# Patient Record
Sex: FEMALE
[De-identification: names, ages, dates, MRNs, and addresses within clinical notes are randomized per-mention and may not be internally consistent; named-entity substitution may affect disease eponyms.]

---

## 2017-05-29 ENCOUNTER — HOSPITAL ENCOUNTER (EMERGENCY)
Dept: HOSPITAL 31 - C.ER | Age: 42
Discharge: HOME | End: 2017-05-29
Payer: MEDICAID

## 2017-05-29 VITALS
HEART RATE: 77 BPM | DIASTOLIC BLOOD PRESSURE: 71 MMHG | SYSTOLIC BLOOD PRESSURE: 111 MMHG | TEMPERATURE: 98.1 F | OXYGEN SATURATION: 99 %

## 2017-05-29 VITALS — RESPIRATION RATE: 20 BRPM

## 2017-05-29 DIAGNOSIS — N39.0: ICD-10-CM

## 2017-05-29 DIAGNOSIS — Z72.0: ICD-10-CM

## 2017-05-29 DIAGNOSIS — J40: Primary | ICD-10-CM

## 2017-05-29 LAB
BACTERIA #/AREA URNS HPF: (no result) /[HPF]
BILIRUB UR-MCNC: NEGATIVE MG/DL
GLUCOSE UR STRIP-MCNC: NORMAL MG/DL
KETONES UR STRIP-MCNC: NEGATIVE MG/DL
LEUKOCYTE ESTERASE UR-ACNC: (no result) LEU/UL
PH UR STRIP: 5 [PH] (ref 5–8)
PROT UR STRIP-MCNC: NEGATIVE MG/DL
RBC # UR STRIP: (no result) /UL
RBC #/AREA URNS HPF: 16 /HPF (ref 0–3)
SP GR UR STRIP: 1.03 (ref 1–1.03)
UROBILINOGEN UR-MCNC: NORMAL MG/DL (ref 0.2–1)
WBC #/AREA URNS HPF: 15 /HPF (ref 0–5)

## 2017-05-29 NOTE — C.PDOC
Time Seen by Provider: 05/29/17 21:56


Chief Complaint (Nursing): Cough, Cold, Congestion


History Per: Patient, Family


Onset/Duration Of Symptoms: Days (2)


Current Symptoms Are (Timing): Still Present


Associated Symptoms: Cough, Sputum, Nasal Congestion


Severity: Moderate


Additional History Per: Prior Records





Past Medical History


Reviewed: Historical Data, Nursing Documentation, Vital Signs


Vital Signs: 





 Last Vital Signs











Temp  98.4 F   05/29/17 21:38


 


Pulse  72   05/29/17 21:38


 


Resp  20   05/29/17 21:38


 


BP  106/68   05/29/17 21:38


 


Pulse Ox  98   05/29/17 21:38














- Medical History


PMH: No Chronic Diseases


Family History: States: Unknown Family Hx





- Social History


Hx Tobacco Use: Yes


Hx Alcohol Use: No


Hx Substance Use: No





- Immunization History


Hx Tetanus Toxoid Vaccination: No


Hx Influenza Vaccination: No


Hx Pneumococcal Vaccination: No





Review Of Systems


Except As Marked, All Systems Reviewed And Found Negative.


Constitutional: Negative for: Fever, Weakness


ENT: Positive for: Nose Congestion


Cardiovascular: Negative for: Chest Pain


Respiratory: Positive for: Cough, Sputum.  Negative for: Shortness of Breath, 

Hemoptysis


Gastrointestinal: Positive for: Abdominal Pain (suprapubic).  Negative for: 

Vomiting, Diarrhea


Genitourinary: Positive for: Dysuria


Musculoskeletal: Negative for: Neck Pain, Back Pain


Skin: Negative for: Rash


Neurological: Negative for: Weakness, Numbness, Seizures, Altered Mental Status





Physical Exam





- Physical Exam


Appears: Non-toxic, No Acute Distress


Skin: Normal Color, Warm, Dry, No Rash


Head: Atraumatic, Normacephalic


Eye(s): bilateral: Normal Inspection, PERRL, EOMI


Ear(s): Bilateral: Normal


Throat: Normal


Neck: Normal ROM, Supple


Cardiovascular: Rhythm Regular


Respiratory: Normal Breath Sounds, No Accessory Muscle Use


Gastrointestinal/Abdominal: Soft, No Tenderness


Back: No CVA Tenderness


Extremity: Normal ROM, No Pedal Edema, No Calf Tenderness


Neurological/Psych: Oriented x3, Normal Motor, Normal Sensation





ED Course And Treatment





- Laboratory Results


Interpretation Of Abnormal: Possible UTI


Urine Pregnancy POC: Negative


O2 Sat by Pulse Oximetry: 98


Pulse Ox Interpretation: Normal





- Radiology


CXR: Interpreted by Me, Viewed By Me


CXR Interpretation: Yes: No Acute Disease





Disposition


Counseled Patient/Family Regarding: Studies Performed, Diagnosis, Need For 

Followup, Rx Given, Smoking Cessation





- Disposition


Referrals: 


[facility] [Outside]


Disposition: HOME/ ROUTINE


Disposition Time: 22:34


Condition: STABLE


Additional Instructions: 


Drink plenty of fluids. Stop smoking. Follow up in the clinic for further 

evaluation and treatment. Return to the ER if you develop shortness of breath, 

fever, vomiting, worsening of symptoms or if you have any other concerns. 


Prescriptions: 


Sulfamethoxazole/Trimethoprim [Bactrim  mg-160 mg] 1 tab PO BID #10 tab


Instructions:  Cold Symptoms (ED), Urinary Tract Infection in Women (ED)


Print Language: French





- Clinical Impression


Clinical Impression: 


 Bronchitis, UTI (urinary tract infection)

## 2017-05-30 NOTE — RAD
HISTORY:

Productive cough  



COMPARISON:

No prior.



TECHNIQUE:

Chest PA and lateral



FINDINGS:



LUNGS:

Mild venous congestion.  Patchy increased markings at the medial 

right lung base may represent superimposed atelectasis and or subtle 

infiltrate.  Bibasilar breast and nipple shadows.



PLEURA:

No significant pleural effusion identified. No pneumothorax apparent.



CARDIOVASCULAR:

Normal.



OSSEOUS STRUCTURES:

No significant abnormalities.



VISUALIZED UPPER ABDOMEN:

Normal.



OTHER FINDINGS:

None.



IMPRESSION:





Mild venous congestion.  Patchy increased markings at the medial 

right lung base may represent superimposed atelectasis and or subtle 

infiltrate.  Bibasilar breast and nipple shadows.

## 2017-08-29 ENCOUNTER — HOSPITAL ENCOUNTER (EMERGENCY)
Dept: HOSPITAL 31 - C.ER | Age: 42
Discharge: HOME | End: 2017-08-29
Payer: COMMERCIAL

## 2017-08-29 VITALS
TEMPERATURE: 98.7 F | DIASTOLIC BLOOD PRESSURE: 78 MMHG | SYSTOLIC BLOOD PRESSURE: 116 MMHG | HEART RATE: 74 BPM | OXYGEN SATURATION: 99 % | RESPIRATION RATE: 18 BRPM

## 2017-08-29 DIAGNOSIS — N94.6: Primary | ICD-10-CM

## 2017-08-29 LAB
BACTERIA #/AREA URNS HPF: (no result) /[HPF]
BILIRUB UR-MCNC: NEGATIVE MG/DL
GLUCOSE UR STRIP-MCNC: NORMAL MG/DL
KETONES UR STRIP-MCNC: NEGATIVE MG/DL
LEUKOCYTE ESTERASE UR-ACNC: (no result) LEU/UL
PH UR STRIP: 6 [PH] (ref 5–8)
PROT UR STRIP-MCNC: NEGATIVE MG/DL
RBC # UR STRIP: (no result) /UL
RBC #/AREA URNS HPF: 8 /HPF (ref 0–3)
SP GR UR STRIP: 1.02 (ref 1–1.03)
UROBILINOGEN UR-MCNC: 2 MG/DL (ref 0.2–1)
WBC #/AREA URNS HPF: 1 /HPF (ref 0–5)

## 2017-08-29 NOTE — C.PDOC
History Of Present Illness





42 y/o female c/o suprapubic pain since her onset of menses 4 days prior. Notes 

usually getting the same pain with her period, yet this lasted longer which 

concerned her. Patient reports increased suprapubic pressure during urination. 

Denies dysuria, vaginal discharge, fever, chills, nausea, vomiting, or any 

other complaints. Patient is still bleeding due to menses. Took no medication 

for pain.





Time Seen by Provider: 08/29/17 22:35


Chief Complaint (Nursing): Female Genitourinary


History Per: Patient


History/Exam Limitations: no limitations


Onset/Duration Of Symptoms: Days (4)


Current Symptoms Are (Timing): Still Present


Severity: Mild


Quality Of Discomfort: Gas


Associated Symptoms: denies: Fever, Chills


Alleviating Factors: None


Recent travel outside of the [place]: No


Additional History Per: Patient


Abnormal Vaginal Bleeding: No





Past Medical History


Reviewed: Historical Data, Nursing Documentation, Vital Signs


Vital Signs: 


 Last Vital Signs











Temp  98.7 F   08/29/17 22:29


 


Pulse  74   08/29/17 22:29


 


Resp  18   08/29/17 22:29


 


BP  116/78   08/29/17 22:29


 


Pulse Ox  99   08/30/17 01:36











Family History: States: Unknown Family Hx





- Social History


Hx Tobacco Use: Yes


Hx Alcohol Use: No


Hx Substance Use: No





- Immunization History


Hx Tetanus Toxoid Vaccination: No


Hx Influenza Vaccination: No


Hx Pneumococcal Vaccination: No





Review Of Systems


Constitutional: Negative for: Fever, Chills


Gastrointestinal: Positive for: Abdominal Pain (Suprapubic).  Negative for: 

Nausea, Vomiting


Genitourinary: Positive for: Vaginal Bleeding (Due to menses).  Negative for: 

Dysuria, Vaginal Discharge





Physical Exam





- Physical Exam


Appears: Non-toxic, No Acute Distress


Skin: Warm, Dry


Head: Atraumatic, Normacephalic


Cardiovascular: Rhythm Regular


Respiratory: Normal Breath Sounds, No Rales, No Rhonchi, No Wheezing


Gastrointestinal/Abdominal: Soft, Tenderness (Mid-suprapubic area), No Guarding

, No Rebound, Other (Obese)


Pelvic: Other (Not performed due to menstral flow)


Neurological/Psych: Oriented x3, Normal Speech, Normal Cognition





ED Course And Treatment


O2 Sat by Pulse Oximetry: 99 (RA)


Pulse Ox Interpretation: Normal


Progress Note: Impression: 42 y/o female c/o suparapubic pain since onset of 

menses 4 days ago.  Plans: UA, Blood work up, Motrin.  UA: negative for 

infection and pregnancy.  Patient is in no acute distress at this time. Patient 

advised she will feel better with Motrin and advised to follow up with PMD for 

further evaluation and to return if symtoms worsens.





Disposition


Counseled Patient/Family Regarding: Diagnosis, Need For Followup, Rx Given





- Disposition


Referrals: 


[facility] [Outside]


[facility] [Outside]


Disposition: HOME/ ROUTINE


Disposition Time: 23:15


Condition: STABLE


Additional Instructions: 


Faith motrin si dolor





Sigue en la clinica de mujeres





Regresa si peor 





Prescriptions: 


Ibuprofen [Motrin] 600 mg PO Q6H #30 tab


Instructions:  Pelvic Pain in Women (ED)


Forms:  [facility] (English)


Print Language: English





- Clinical Impression


Clinical Impression: 


 Pelvic pain, Dysmenorrhea








- Scribe Statement


The provider has reviewed the documentation as recorded by the Timibkirstin whitley





All medical record entries made by the Timibkirstin were at my direction and 

personally dictated by me. I have reviewed the chart and agree that the record 

accurately reflects my personal performance of the history, physical exam, 

medical decision making, and the department course for this patient. I have 

also personally directed, reviewed, and agree with the discharge instructions 

and disposition.

## 2018-05-25 ENCOUNTER — HOSPITAL ENCOUNTER (EMERGENCY)
Dept: HOSPITAL 31 - C.ER | Age: 43
Discharge: HOME | End: 2018-05-25
Payer: COMMERCIAL

## 2018-05-25 VITALS
SYSTOLIC BLOOD PRESSURE: 112 MMHG | HEART RATE: 80 BPM | TEMPERATURE: 98.2 F | RESPIRATION RATE: 18 BRPM | DIASTOLIC BLOOD PRESSURE: 72 MMHG

## 2018-05-25 VITALS — OXYGEN SATURATION: 98 %

## 2018-05-25 DIAGNOSIS — R10.30: Primary | ICD-10-CM

## 2018-05-25 LAB
ALBUMIN SERPL-MCNC: 3.9 G/DL (ref 3.5–5)
ALBUMIN/GLOB SERPL: 1.2 {RATIO} (ref 1–2.1)
ALT SERPL-CCNC: 27 U/L (ref 9–52)
AST SERPL-CCNC: 17 U/L (ref 14–36)
BASOPHILS # BLD AUTO: 0 K/UL (ref 0–0.2)
BASOPHILS NFR BLD: 0.5 % (ref 0–2)
BILIRUB UR-MCNC: NEGATIVE MG/DL
BUN SERPL-MCNC: 15 MG/DL (ref 7–17)
CALCIUM SERPL-MCNC: 9.1 MG/DL (ref 8.6–10.4)
EOSINOPHIL # BLD AUTO: 0.1 K/UL (ref 0–0.7)
EOSINOPHIL NFR BLD: 1.2 % (ref 0–4)
ERYTHROCYTE [DISTWIDTH] IN BLOOD BY AUTOMATED COUNT: 12.8 % (ref 11.5–14.5)
GFR NON-AFRICAN AMERICAN: > 60
GLUCOSE UR STRIP-MCNC: NORMAL MG/DL
HCG,QUALITATIVE URINE: NEGATIVE
HGB BLD-MCNC: 11.9 G/DL (ref 11–16)
LEUKOCYTE ESTERASE UR-ACNC: (no result) LEU/UL
LIPASE: 39 U/L (ref 23–300)
LYMPHOCYTES # BLD AUTO: 2.2 K/UL (ref 1–4.3)
LYMPHOCYTES NFR BLD AUTO: 28.6 % (ref 20–40)
MCH RBC QN AUTO: 31.4 PG (ref 27–31)
MCHC RBC AUTO-ENTMCNC: 34.7 G/DL (ref 33–37)
MCV RBC AUTO: 90.4 FL (ref 81–99)
MONOCYTES # BLD: 0.5 K/UL (ref 0–0.8)
MONOCYTES NFR BLD: 6.3 % (ref 0–10)
NEUTROPHILS # BLD: 4.9 K/UL (ref 1.8–7)
NEUTROPHILS NFR BLD AUTO: 63.4 % (ref 50–75)
NRBC BLD AUTO-RTO: 0 % (ref 0–2)
PH UR STRIP: 6 [PH] (ref 5–8)
PLATELET # BLD: 258 K/UL (ref 130–400)
PMV BLD AUTO: 8.2 FL (ref 7.2–11.7)
PROT UR STRIP-MCNC: NEGATIVE MG/DL
RBC # BLD AUTO: 3.78 MIL/UL (ref 3.8–5.2)
RBC # UR STRIP: (no result) /UL
SP GR UR STRIP: 1.02 (ref 1–1.03)
SQUAMOUS EPITHIAL: 1 /HPF (ref 0–5)
UROBILINOGEN UR-MCNC: NORMAL MG/DL (ref 0.2–1)
WBC # BLD AUTO: 7.8 K/UL (ref 4.8–10.8)

## 2018-05-25 PROCEDURE — 99284 EMERGENCY DEPT VISIT MOD MDM: CPT

## 2018-05-25 PROCEDURE — 80053 COMPREHEN METABOLIC PANEL: CPT

## 2018-05-25 PROCEDURE — 74022 RADEX COMPL AQT ABD SERIES: CPT

## 2018-05-25 PROCEDURE — 81001 URINALYSIS AUTO W/SCOPE: CPT

## 2018-05-25 PROCEDURE — 96374 THER/PROPH/DIAG INJ IV PUSH: CPT

## 2018-05-25 PROCEDURE — 83690 ASSAY OF LIPASE: CPT

## 2018-05-25 PROCEDURE — 84703 CHORIONIC GONADOTROPIN ASSAY: CPT

## 2018-05-25 PROCEDURE — 85025 COMPLETE CBC W/AUTO DIFF WBC: CPT

## 2018-05-25 NOTE — C.PDOC
History Of Present Illness


41 y/o female presents to ED with complaints of lower abdominal pain for 3 days 

associated with urinary frequency. Patient states last bowel movement was 

earlier  today and was normal. Patient reports she was recently tested for UTI 

and was given Cipro which she took 1 dose of but didnt think it was helping. 

Patient denies dysuria, vaginal bleeding, vaginal discharge, n/v/d, fever, back 

pain or any other complaints at this time. LMP 2 days ago as per patient. 


Time Seen by Provider: 05/25/18 12:36


Chief Complaint (Nursing): Abdominal Pain


History Per: Patient,  (Corina Thompson)


History/Exam Limitations: no limitations


Onset/Duration Of Symptoms: Days


Current Symptoms Are (Timing): Still Present


Location Of Pain/Discomfort: Other (pelvic pain)





Past Medical History


Reviewed: Historical Data, Nursing Documentation, Vital Signs


Vital Signs: 


 Last Vital Signs











Temp  98.2 F   05/25/18 16:19


 


Pulse  80   05/25/18 16:19


 


Resp  18   05/25/18 16:19


 


BP  112/72   05/25/18 16:19


 


Pulse Ox  98   05/25/18 16:19














- Medical History


PMH: No Chronic Diseases


Surgical History: No Surg Hx


Family History: States: No Known Family Hx





- Social History


Hx Tobacco Use: Yes


Hx Alcohol Use: No


Hx Substance Use: No





- Immunization History


Hx Tetanus Toxoid Vaccination: No


Hx Influenza Vaccination: No


Hx Pneumococcal Vaccination: No





Review Of Systems


Constitutional: Negative for: Fever, Chills


Gastrointestinal: Negative for: Nausea, Vomiting


Genitourinary: Positive for: Frequency, Pelvic Pain.  Negative for: Dysuria, 

Vaginal Discharge, Vaginal Bleeding


Musculoskeletal: Negative for: Back Pain


Skin: Negative for: Rash


Neurological: Negative for: Weakness, Numbness





Physical Exam





- Physical Exam


Appears: Non-toxic, No Acute Distress


Skin: Warm, Dry, No Rash


Head: Atraumatic, Normacephalic


Eye(s): bilateral: Normal Inspection, EOMI


Nose: Normal


Oral Mucosa: Moist


Neck: Normal ROM, Supple


Cardiovascular: Rhythm Regular


Respiratory: Normal Breath Sounds, No Rales, No Rhonchi, No Wheezing


Gastrointestinal/Abdominal: Soft, Tenderness (suprapubic tenderness), No 

Guarding, No Rebound


Back: No CVA Tenderness, No Vertebral Tenderness


Extremity: Normal ROM


Neurological/Psych: Oriented x3, Normal Speech, Normal Cognition





ED Course And Treatment





- Laboratory Results


Result Diagrams: 


 05/25/18 13:21





 05/25/18 13:21


O2 Sat by Pulse Oximetry: 98 (RA)


Pulse Ox Interpretation: Normal


Progress Note: Blood work, IV fluids, obstructive series and UA ordered. 

Toradol administered.  On re eval patient states pain has improved. Tolerating 

PO. Afebrile. Abdomen remains soft, nontender. Discussed with pt signs and 

symptoms of concern and limitations of work up. Pt requests to be discharged 

noting she is asymptomatic. Instructed to return to ER if symptoms persist or 

worsen.





Disposition





- Disposition


Referrals: 


[facility] [Outside]


Disposition: HOME/ ROUTINE


Disposition Time: 15:42


Condition: STABLE


Additional Instructions: 


Increase you fluids and fiber in your diet. Follow up with PMD in 1-2 days. 

Return to ER if symptoms persist or worsen.








Aumente yisel lquidos y fibra en romano dieta. Danial un seguimiento con PMD en 1-2 d

as. Regrese a la nisa de emergencias si los sntomas persisten o empeoran.


Prescriptions: 


Polyethylene Glycol 3350 [Miralax] 17 gm PO DAILY #85 gm


Instructions:  Acute Abdomen (Belly Pain), Adult (DC)


Forms:  [employer] (English)


Print Language: Ecuadorean





- Clinical Impression


Clinical Impression: 


 Abdominal pain








- PA / NP / Resident Statement


MD/DO has reviewed & agrees with the documentation as recorded.





- Scribe Statement


The provider has reviewed the documentation as recorded by the Scribe


Donna Ryder





All medical record entries made by the Timibe were at my direction and 

personally dictated by me. I have reviewed the chart and agree that the record 

accurately reflects my personal performance of the history, physical exam, 

medical decision making, and the department course for this patient. I have 

also personally directed, reviewed, and agree with the discharge instructions 

and disposition.

## 2018-10-21 ENCOUNTER — HOSPITAL ENCOUNTER (EMERGENCY)
Dept: HOSPITAL 31 - C.ER | Age: 43
Discharge: HOME | End: 2018-10-21
Payer: SELF-PAY

## 2018-10-21 VITALS — OXYGEN SATURATION: 100 %

## 2018-10-21 VITALS
DIASTOLIC BLOOD PRESSURE: 65 MMHG | HEART RATE: 63 BPM | SYSTOLIC BLOOD PRESSURE: 112 MMHG | RESPIRATION RATE: 20 BRPM | TEMPERATURE: 98.9 F

## 2018-10-21 DIAGNOSIS — R10.9: ICD-10-CM

## 2018-10-21 DIAGNOSIS — D25.9: Primary | ICD-10-CM

## 2018-10-21 LAB
ALBUMIN SERPL-MCNC: 4.1 G/DL (ref 3.5–5)
ALBUMIN/GLOB SERPL: 1.4 {RATIO} (ref 1–2.1)
ALT SERPL-CCNC: 24 U/L (ref 9–52)
AST SERPL-CCNC: 13 U/L (ref 14–36)
BASOPHILS # BLD AUTO: 0.1 K/UL (ref 0–0.2)
BASOPHILS NFR BLD: 0.7 % (ref 0–2)
BILIRUB UR-MCNC: NEGATIVE MG/DL
BUN SERPL-MCNC: 13 MG/DL (ref 7–17)
CALCIUM SERPL-MCNC: 9.1 MG/DL (ref 8.6–10.4)
EOSINOPHIL # BLD AUTO: 0.1 K/UL (ref 0–0.7)
EOSINOPHIL NFR BLD: 1.6 % (ref 0–4)
ERYTHROCYTE [DISTWIDTH] IN BLOOD BY AUTOMATED COUNT: 12.7 % (ref 11.5–14.5)
GFR NON-AFRICAN AMERICAN: > 60
GLUCOSE UR STRIP-MCNC: NORMAL MG/DL
HGB BLD-MCNC: 12.1 G/DL (ref 11–16)
LEUKOCYTE ESTERASE UR-ACNC: (no result) LEU/UL
LIPASE: 49 U/L (ref 23–300)
LYMPHOCYTES # BLD AUTO: 2.5 K/UL (ref 1–4.3)
LYMPHOCYTES NFR BLD AUTO: 32.5 % (ref 20–40)
MCH RBC QN AUTO: 30.8 PG (ref 27–31)
MCHC RBC AUTO-ENTMCNC: 34.6 G/DL (ref 33–37)
MCV RBC AUTO: 89.1 FL (ref 81–99)
MONOCYTES # BLD: 0.4 K/UL (ref 0–0.8)
MONOCYTES NFR BLD: 5.8 % (ref 0–10)
NEUTROPHILS # BLD: 4.6 K/UL (ref 1.8–7)
NEUTROPHILS NFR BLD AUTO: 59.4 % (ref 50–75)
NRBC BLD AUTO-RTO: 0 % (ref 0–2)
PH UR STRIP: 5 [PH] (ref 5–8)
PLATELET # BLD: 282 K/UL (ref 130–400)
PMV BLD AUTO: 8.4 FL (ref 7.2–11.7)
PROT UR STRIP-MCNC: NEGATIVE MG/DL
RBC # BLD AUTO: 3.92 MIL/UL (ref 3.8–5.2)
RBC # UR STRIP: (no result) /UL
SP GR UR STRIP: 1.03 (ref 1–1.03)
SQUAMOUS EPITHIAL: 1 /HPF (ref 0–5)
UROBILINOGEN UR-MCNC: NORMAL MG/DL (ref 0.2–1)
WBC # BLD AUTO: 7.7 K/UL (ref 4.8–10.8)

## 2018-10-21 PROCEDURE — 85025 COMPLETE CBC W/AUTO DIFF WBC: CPT

## 2018-10-21 PROCEDURE — 81001 URINALYSIS AUTO W/SCOPE: CPT

## 2018-10-21 PROCEDURE — 96374 THER/PROPH/DIAG INJ IV PUSH: CPT

## 2018-10-21 PROCEDURE — 74177 CT ABD & PELVIS W/CONTRAST: CPT

## 2018-10-21 PROCEDURE — 80053 COMPREHEN METABOLIC PANEL: CPT

## 2018-10-21 PROCEDURE — 87086 URINE CULTURE/COLONY COUNT: CPT

## 2018-10-21 PROCEDURE — 96361 HYDRATE IV INFUSION ADD-ON: CPT

## 2018-10-21 PROCEDURE — 83690 ASSAY OF LIPASE: CPT

## 2018-10-21 PROCEDURE — 99285 EMERGENCY DEPT VISIT HI MDM: CPT

## 2018-10-21 NOTE — C.PDOC
Addendum entered and electronically signed by Estuardo Cruz MD  10/21/18 19:46: 








Disposition


Counseled Patient/Family Regarding: Studies Performed, Diagnosis, Need For 

Followup, Rx Given


Clinical Impression: 


 Abdominal pain, Uterine fibroid





Disposition: HOME/ ROUTINE


Disposition Time: 19:00


Condition: FAIR


Instructions:  Acute Abdomen (Belly Pain), Adult (DC), Uterine Fibroids (DC)


Referrals: 


 Service [Outside]


[facility] [Outside]


Stand Alone Forms:  [employer] (English)


Print Language: Scottish





Addendum entered and electronically signed by Estuardo Cruz MD  10/21/18 19:42: 








Disposition


Counseled Patient/Family Regarding: Studies Performed, Diagnosis, Need For 

Followup


Clinical Impression: 


 Abdominal pain, Uterine fibroid





Disposition: HOME/ ROUTINE


Disposition Time: 19:00


Condition: FAIR


Instructions:  Uterine Fibroids (DC), Acute Abdomen (Belly Pain), Adult (DC)


Referrals: 


[facility] [Outside]


 Service [Outside]


Stand Alone Forms:  [employer] (English)





Addendum


Addendum: 





10/21/18 19:41


Upon provider reevaluation patient is feeling better, is medically stable, and 

requires no further treatment in the ED at this time. Patient will be discharged

 home   . Counseling was provided and all questions were answered regarding 

diagnosis and need for follow up with the referred clinic. There is agreement to

 discharge plan. Return if symptoms persist or worsen.





Original Note:








History Of Present Illness





43 years old female presents to ED for complaints of LLQ abdominal pain that 

began 1 day ago. Patient describes pain as sharp in nature. Denies vaginal 

bleeding or discharge, hematuria, dysuria, fever, nausea, vomiting, or diarrhea.

 Patient reports she has been eating well. 


Chief Complaint (Nursing): Abdominal Pain


History Per: Patient


History/Exam Limitations: no limitations


Onset/Duration Of Symptoms: Days (1)


Current Symptoms Are (Timing): Still Present


Location Of Pain/Discomfort: LLQ


Quality Of Discomfort: Sharp


Associated Symptoms: denies: Fever, Chills, Urinary Symptoms


Exacerbating Factors: None


Alleviating Factors: None


Last Bowel Movement: Today


Recent travel outside of the [place]: No





Past Medical History


Reviewed: Historical Data, Nursing Documentation, Vital Signs


Vital Signs: 





                                Last Vital Signs











Temp  98 F   10/21/18 16:25


 


Pulse  68   10/21/18 16:25


 


Resp  16   10/21/18 16:25


 


BP  119/70   10/21/18 16:25


 


Pulse Ox  100   10/21/18 16:25














- Medical History


PMH: No Chronic Diseases


Family History: States: No Known Family Hx





- Social History


Hx Tobacco Use: Yes


Hx Alcohol Use: Yes


Hx Substance Use: No





- Immunization History


Hx Tetanus Toxoid Vaccination: No


Hx Influenza Vaccination: No


Hx Pneumococcal Vaccination: No





Review Of Systems


Constitutional: Negative for: Fever, Chills


Gastrointestinal: Positive for: Abdominal Pain (LLQ).  Negative for: Nausea, 

Vomiting, Diarrhea


Genitourinary: Negative for: Dysuria, Hematuria, Vaginal Discharge, Vaginal 

Bleeding


Skin: Negative for: Rash


Neurological: Negative for: Weakness, Numbness





Physical Exam





- Physical Exam


Appears: Non-toxic, No Acute Distress


Skin: Normal Color, Warm, Dry, No Rash


Head: Atraumatic, Normacephalic


Eye(s): bilateral: Normal Inspection, PERRL, EOMI


Oral Mucosa: Moist


Neck: Normal ROM, Supple


Chest: Symmetrical, No Tenderness


Cardiovascular: Rhythm Regular, No Murmur


Respiratory: Normal Breath Sounds, No Decreased Breath Sounds, No Rales, No Rho

nchi, No Wheezing


Gastrointestinal/Abdominal: Bowel Sounds (Active ), Soft, Tenderness (Minimal 

LLQ point tenderness ), No Guarding, No Rebound


Extremity: Normal ROM


Extremity: Bilateral: Atraumatic, Normal Color And Temperature, Normal ROM


Pulses: Left Radial: Normal, Right Radial: Normal


Neurological/Psych: Oriented x3, Normal Speech


Gait: Steady





ED Course And Treatment





- Laboratory Results


Result Diagrams: 


                                 10/21/18 16:54





                                 10/21/18 16:54


O2 Sat by Pulse Oximetry: 100 (RA)


Pulse Ox Interpretation: Normal





Medical Decision Making


Medical Decision Making: 





Plan:


* IV fluids


* Toradol


* Blood work


* Urine Culture


* Urinalysis


* CT abdomen/Pelvis








Disposition





- Disposition


Disposition Time: 19:00


Condition: STABLE


Forms:  [facility] (English)





- Clinical Impression


Clinical Impression: 


 Abdominal pain








- Scribe Statement


The provider has reviewed the documentation as recorded by the Scribe





Brenna Champagne





All medical record entries made by the Scribe were at my direction and 

personally dictated by me. I have reviewed the chart and agree that the record 

accurately reflects my personal performance of the history, physical exam, 

medical decision making, and the department course for this patient. I have also

 personally directed, reviewed, and agree with the discharge instructions and 

disposition.

## 2018-10-22 ENCOUNTER — HOSPITAL ENCOUNTER (EMERGENCY)
Dept: HOSPITAL 31 - C.ER | Age: 43
LOS: 1 days | Discharge: HOME | End: 2018-10-23
Payer: SELF-PAY

## 2018-10-22 DIAGNOSIS — K59.00: Primary | ICD-10-CM

## 2018-10-22 DIAGNOSIS — D25.9: ICD-10-CM

## 2018-10-22 PROCEDURE — 99284 EMERGENCY DEPT VISIT MOD MDM: CPT

## 2018-10-22 PROCEDURE — 96372 THER/PROPH/DIAG INJ SC/IM: CPT

## 2018-10-22 PROCEDURE — 74018 RADEX ABDOMEN 1 VIEW: CPT

## 2018-10-22 NOTE — CT
PROCEDURE:  CT Abdomen and Pelvis with contrast



HISTORY:

LLQ tenderness



COMPARISON:

None.



TECHNIQUE:

CT scan of the abdomen pelvis was performed after the administration 

of IV and oral contrast.  Coronal and sagittal reconstructions were 

also acquired. . 



Contrast Dose: 100 cc Visipaque 320 IV contrast



Radiation dose:



Total exam DLP = 1060.36 mGy-cm.



FINDINGS:



LOWER THORAX:

Visualized lung bases demonstrate minimal posterior dependent 

bibasilar atelectasis. 



LIVER:

Unremarkable.  



GALLBLADDER AND BILE DUCTS:

Gallstones noted in the gallbladder. 



PANCREAS:

Unremarkable.



SPLEEN:

Unremarkable. 



ADRENALS:

Unremarkable. 



KIDNEYS AND URETERS:

Unremarkable. No hydronephrosis. 



VASCULATURE:

The aorta is normal in caliber. 



STOMACH AND BOWEL:

There is no abnormal small or large bowel dilatation. 



APPENDIX:

Normal appendix. 



PERITONEUM:

Unremarkable. No free fluid. No free air. 



LYMPH NODES:

There is no significant abdominal or pelvic lymphadenopathy. 



BLADDER:

Unremarkable. 



REPRODUCTIVE:

Uterus is heterogeneous in appearance with possible fibroids.  There 

is an exophytic mass arising from the uterine fundus measuring 

approximately 6 x 5.1 cm, possibly a fibroid.  Other etiologies not 

excluded.  Tampon noted in the vagina. 



BONES:

There are multiple sclerotic lesions: In the left iliac bone 

measuring 1.8 cm, in the left sacrum measuring 0.8 cm, in the left 

posterior acetabulum measuring 1.1 cm, in the right femoral head 

measuring 0.8 cm, and in the left femoral head measuring 0.5 cm.  

Findings favor bone islands.



OTHER FINDINGS:

None.



IMPRESSION:

Heterogeneous appearance of the uterus, possibly due to fibroids.  

Exophytic mass of  the uterine fundus measuring 6 cm, also possibly 

fibroid.  Recommend ultrasound pelvis for further evaluation.



Cholelithiasis. 



Additional findings as above. 



Preliminary impression was provided by the Teleradiology service - 

[facility].  Findings are concordant.

## 2018-10-23 VITALS
OXYGEN SATURATION: 98 % | SYSTOLIC BLOOD PRESSURE: 102 MMHG | TEMPERATURE: 98 F | RESPIRATION RATE: 20 BRPM | HEART RATE: 62 BPM | DIASTOLIC BLOOD PRESSURE: 70 MMHG

## 2018-10-23 NOTE — C.PDOC
History Of Present Illness


43 year old female presents to the ER with a complaint of persistent lower 

abdominal pain. Patient was sen here yesterday in the ED and CT revealed 

fibroids. Denies nausea, vomiting, vaginal bleeding, or vaginal discharge. 

Patient has been taking motrin with some relief.


Time Seen by Provider: 10/22/18 22:36


Chief Complaint (Nursing): Abdominal Pain


History Per: Patient


History/Exam Limitations: no limitations


Onset/Duration Of Symptoms: Days


Current Symptoms Are (Timing): Still Present


Location Of Pain/Discomfort: Suprapubic


Radiation Of Pain To:: None


Quality Of Discomfort: Unable To Describe


Associated Symptoms: denies: Nausea, Vomiting, Other (Vaginal bleeding, Vaginal 

discharge)


Exacerbating Factors: None


Alleviating Factors: None


Recent travel outside of the [place]: No


Abnormal Vaginal Bleeding: No





Past Medical History


Reviewed: Historical Data, Nursing Documentation, Vital Signs


Vital Signs: 





                                Last Vital Signs











Temp  98 F   10/23/18 00:07


 


Pulse  62   10/23/18 00:07


 


Resp  20   10/23/18 00:07


 


BP  102/70   10/23/18 00:07


 


Pulse Ox  98   10/23/18 00:07











Family History: States: No Known Family Hx





- Social History


Hx Tobacco Use: Yes


Hx Alcohol Use: Yes


Hx Substance Use: No





- Immunization History


Hx Tetanus Toxoid Vaccination: No


Hx Influenza Vaccination: No


Hx Pneumococcal Vaccination: No





Review Of Systems


Constitutional: Negative for: Fever, Chills


Cardiovascular: Negative for: Chest Pain, Palpitations


Respiratory: Negative for: Cough, Shortness of Breath


Gastrointestinal: Positive for: Abdominal Pain.  Negative for: Nausea, Vomiting


Genitourinary: Negative for: Vaginal Discharge, Vaginal Bleeding


Neurological: Negative for: Weakness, Numbness





Physical Exam





- Physical Exam


Appears: Non-toxic


Skin: Normal Color, Warm, Dry


Head: Atraumatic, Normacephalic


Eye(s): bilateral: Normal Inspection


Oral Mucosa: Moist


Neck: Normal, Supple


Chest: Symmetrical, No Tenderness


Cardiovascular: Rhythm Regular


Respiratory: Normal Breath Sounds, No Rales, No Rhonchi, No Wheezing


Gastrointestinal/Abdominal: Soft, No Tenderness


Back: No CVA Tenderness


Neurological/Psych: Oriented x3, Normal Speech





ED Course And Treatment


O2 Sat by Pulse Oximetry: 98 (Room air)


Pulse Ox Interpretation: Normal


Progress Note: Abdominal x-ray ordered. Percocet and toradol administered.





Disposition





- Disposition


Referrals: 


 Service [Outside]


[facility] [Outside]


Disposition: HOME/ ROUTINE


Disposition Time: 00:15


Condition: IMPROVED


Additional Instructions: 





JOE PIÑA, thank you for letting us take care of you today. The emergency 

medical care you received today was directed at your acute symptoms. If you were

 prescribed any medication, please fill it and take as directed. It may take 

several days for your symptoms to resolve. Return to the Emergency Department if

 your symptoms worsen, do not improve, or if you have any other problems.





Please contact your doctor or call one of the physicians/clinics you have been 

referred to that are listed on the Patient Visit Information form that is 

included in your discharge packet. Bring any paperwork you were given at 

discharge with you along with any medications you are taking to your follow up 

visit. Our treatment cannot replace ongoing medical care by a primary care 

provider outside of the emergency department.





Thank you for allowing the [facility] beds at the [facility] team to be part of your care today.

















Follow with the women's clinic this week for re-evaluation and further 

management.








JOE PIÑA, modesto por dejarnos cuidar de anne de la fuente. La atencin mdica de

 emergencia que recibi hoy se dirigi a yisel sntomas agudos. Si le recetaron 

algn medicamento, llnelo y tmelo segn las indicaciones. Los sntomas pueden 

tardar varios lopez en resolverse. Regrese al Departamento de Emergencias si yisel 

sntomas empeoran, no mejoran o si tiene otros problemas.





Comunquese con romano mdico o llame a karl de los mdicos / clnicas a los que ha 

sido referido que figuran en el formulario de Informacin de visita al paciente 

que se incluye en romano paquete de kay[place]. Lleve con usted a romano consulta de 

seguimiento toda la documentacin que recibi del kayley junto con los 

medicamentos que est tomando. Nuestro tratamiento no puede reemplazar la 

atencin mdica continua por parte de un proveedor de atencin primaria fuera 

del departamento de emergencias.





Modesto por permitir que el equipo de [facility] beds at the [facility] sea parte de romano atencin 

hoy.

















Siga con la clnica de mujeres esta semana para reevaluar y administrar ms.


Prescriptions: 


Docusate [Colace] 100 mg PO Q8 PRN #15 cap


 PRN Reason: Constipation


traMADol [Ultram] 50 mg PO Q8 PRN #15 tab


 PRN Reason: Pain, Severe (8-10)


Instructions:  Constipation, Adult (DC), Uterine Fibroids (DC)


Forms:  Gen Discharge Inst Citizen of [place], [facility] (Citizen of [place])


Print Language: Maori





- Clinical Impression


Clinical Impression: 


 Constipation, Uterine fibroid








- Scribe Statement


The provider has reviewed the documentation as recorded by the Scribe





Oziel Leung





All medical record entries made by the Scribe were at my direction and 

personally dictated by me. I have reviewed the chart and agree that the record 

accurately reflects my personal performance of the history, physical exam, 

medical decision making, and the department course for this patient. I have also

 personally directed, reviewed, and agree with the discharge instructions and 

disposition.

## 2018-10-23 NOTE — RAD
Date of service: 



10/22/2018



HISTORY:

 lower abdominal pain 



COMPARISON:

10/21/2018 CT abdomen and pelvis



FINDINGS:



BOWEL:

Moderate stool retention especially right colon.  No bowel 

obstruction. 







BONES:

Multiple oval sclerotic lesions-without any additional oncological 

history-bone islands-favored.



Bilateral SI joint sclerotic arthrosis.



OTHER FINDINGS:

None.



IMPRESSION:

Stool retention.  No bowel obstruction suggested.



Multiple pelvic sclerotic foci-without any additional oncological 

history-bone islands-favored.



Bilateral sacroiliac sclerotic arthrosis

## 2019-02-18 ENCOUNTER — HOSPITAL ENCOUNTER (EMERGENCY)
Dept: HOSPITAL 31 - C.ER | Age: 44
LOS: 1 days | Discharge: HOME | End: 2019-02-19
Payer: COMMERCIAL

## 2019-02-18 VITALS — RESPIRATION RATE: 18 BRPM

## 2019-02-18 VITALS — OXYGEN SATURATION: 100 %

## 2019-02-18 DIAGNOSIS — Z3A.01: ICD-10-CM

## 2019-02-18 DIAGNOSIS — O20.0: Primary | ICD-10-CM

## 2019-02-18 LAB
BACTERIA #/AREA URNS HPF: (no result) /[HPF]
BASOPHILS # BLD AUTO: 0 K/UL (ref 0–0.2)
BASOPHILS NFR BLD: 0.4 % (ref 0–2)
BILIRUB UR-MCNC: NEGATIVE MG/DL
BUN SERPL-MCNC: 10 MG/DL (ref 7–17)
CALCIUM SERPL-MCNC: 8.7 MG/DL (ref 8.6–10.4)
EOSINOPHIL # BLD AUTO: 0.1 K/UL (ref 0–0.7)
EOSINOPHIL NFR BLD: 0.9 % (ref 0–4)
ERYTHROCYTE [DISTWIDTH] IN BLOOD BY AUTOMATED COUNT: 13 % (ref 11.5–14.5)
GFR NON-AFRICAN AMERICAN: > 60
GLUCOSE UR STRIP-MCNC: NORMAL MG/DL
HGB BLD-MCNC: 12.3 G/DL (ref 11–16)
LEUKOCYTE ESTERASE UR-ACNC: (no result) LEU/UL
LYMPHOCYTES # BLD AUTO: 2.3 K/UL (ref 1–4.3)
LYMPHOCYTES NFR BLD AUTO: 27.2 % (ref 20–40)
MCH RBC QN AUTO: 30.4 PG (ref 27–31)
MCHC RBC AUTO-ENTMCNC: 33.6 G/DL (ref 33–37)
MCV RBC AUTO: 90.5 FL (ref 81–99)
MONOCYTES # BLD: 0.6 K/UL (ref 0–0.8)
MONOCYTES NFR BLD: 7.1 % (ref 0–10)
NEUTROPHILS # BLD: 5.5 K/UL (ref 1.8–7)
NEUTROPHILS NFR BLD AUTO: 64.4 % (ref 50–75)
NRBC BLD AUTO-RTO: 0 % (ref 0–2)
PH UR STRIP: 8 [PH] (ref 5–8)
PLATELET # BLD: 246 K/UL (ref 130–400)
PMV BLD AUTO: 8.1 FL (ref 7.2–11.7)
PROT UR STRIP-MCNC: NEGATIVE MG/DL
RBC # BLD AUTO: 4.03 MIL/UL (ref 3.8–5.2)
RBC # UR STRIP: (no result) /UL
SP GR UR STRIP: 1.02 (ref 1–1.03)
SQUAMOUS EPITHIAL: 1 /HPF (ref 0–5)
UROBILINOGEN UR-MCNC: NORMAL MG/DL (ref 0.2–1)
WBC # BLD AUTO: 8.5 K/UL (ref 4.8–10.8)

## 2019-02-18 NOTE — C.PDOC
History Of Present Illness





43 year old female who is 4 weeks pregnant, , present with lower abdominal 

pain since yesterday and states today after using the bathroom she wiped and 

noticed a little pinkish blood on the paper. Denies any other episodes of va

ginal bleeding, fever, nausea, or vomiting. Patient has not had US for this 

pregnancy, has an appointment for 19.





Time Seen by Provider: 19 20:37


Chief Complaint (Nursing): Abdominal Pain


History Per: Patient


History/Exam Limitations: no limitations


Onset/Duration Of Symptoms: Days


Current Symptoms Are (Timing): Still Present


Associated Symptoms: denies: Fever, Nausea, Vomiting


Exacerbating Factors: None


Alleviating Factors: None


Recent travel outside of the [place]: No


Abnormal Vaginal Bleeding: Yes





Past Medical History


Reviewed: Historical Data, Nursing Documentation, Vital Signs


Vital Signs: 





                                Last Vital Signs











Temp  97.8 F   19 20:11


 


Pulse  60   19 20:11


 


Resp  16   19 20:11


 


BP  116/60   19 20:11


 


Pulse Ox  100   19 20:11











Family History: States: Unknown Family Hx





- Social History


Hx Tobacco Use: Yes


Hx Alcohol Use: Yes


Hx Substance Use: No





- Immunization History


Hx Tetanus Toxoid Vaccination: No


Hx Influenza Vaccination: No


Hx Pneumococcal Vaccination: No





Review Of Systems


Constitutional: Negative for: Fever, Chills


ENT: Negative for: Nose Discharge, Nose Congestion


Cardiovascular: Negative for: Chest Pain, Palpitations


Respiratory: Negative for: Cough, Shortness of Breath


Gastrointestinal: Positive for: Abdominal Pain.  Negative for: Nausea, Vomiting


Genitourinary: Positive for: Vaginal Bleeding


Musculoskeletal: Negative for: Back Pain


Neurological: Negative for: Weakness, Numbness





Physical Exam





- Physical Exam


Appears: Non-toxic


Skin: Normal Color, Warm, Dry


Head: Atraumatic, Normacephalic


Eye(s): bilateral: Normal Inspection


Oral Mucosa: Moist


Neck: Normal, Supple


Chest: Symmetrical, No Tenderness


Cardiovascular: Rhythm Regular


Respiratory: Normal Breath Sounds, No Rales, No Rhonchi, No Wheezing


Gastrointestinal/Abdominal: Soft, No Tenderness


Back: No CVA Tenderness


Neurological/Psych: Oriented x3, Normal Speech





ED Course And Treatment





- Laboratory Results


Result Diagrams: 


                                 19 21:08





                                 19 21:08


O2 Sat by Pulse Oximetry: 100 (Room air)


Pulse Ox Interpretation: Normal





- CT Scan/US


  ** Pregnancy US


Other Rad Studies (CT/US): Read By Radiologist, Radiology Report Reviewed


CT/US Interpretation: EXAM:  US Obstetrical, Complete <14 weeks.  CLINICAL 

HISTORY:  4 weeks pregnant , vaginal bleeding.  TECHNIQUE:  Transvaginal and 

transabdominal imaging of the maternal pelvis and a <14 week gestation with 

image documentation.  COMPARISON:  None provided.  FINDINGS:  GESTATION:  

UTERUS:  Uterus measures 11.1 x 6.9 x 7.0 cm.  There is a fibroid within the 

superior portion of the uterus measuring 6.1 x 5.4 x 6.6 cm.  There is a 

posterior fibroid measuring 2.4 x 2.2 x 2.2 cm.  A 2nd posterior fibroid 

measures 1.5 x 1.1 x 1.6 cm.  CERVIX:  Cervix measures 2.9 cm and appears 

closed.  OVARIES:  Unremarkable. No mass.  FREE FLUID:  No free fluid.  

MISCELLANEOUS:  A gestational sac is present.  A yolk sac is present.  Mean sac 

diameter measures 1.3 cm compatible with an estimated ultrasound age of 5.4 

weeks.  No fetal pole is seen. It is unclear whether this is due to the early 

phase of the gestation. Consider follow up imaging in approximately 1-2 weeks to

demonstrate the presence of fetal pole and fetal heart motion if clinically 

indicated.  Right ovary measures 2.4 x 1.5 x 1.9 cm.  Left ovary measures 3.1 x 

2.0 x 2.6 cm.  Left ovary demonstrates a corpus luteum cyst measuring 2.0 cm.  

Both ovaries demonstrate normal Doppler flow.  IMPRESSION:  1. Multi fibroid 

uterus.  2. A small gestational sac is present consistent with an estimated 

ultrasound age of 5.4 weeks.  3. No fetal pole is seen. It is unclear whether 

this is due to the early phase of the gestation. Consider follow up imaging in 

approximately 1-2 weeks to demonstrate the presence of fetal pole and fetal 

heart motion if clinically indicated.





Medical Decision Making


Medical Decision Making: 





Transvaginal pelvic u/s done. Results reviewed and discussed with patient. 

Advised keeping OB appointment on 3/7 as she will need repeat ultrasound. Return

to the ED for any new or worsening symptoms.





Disposition





- Disposition


Disposition: HOME/ ROUTINE


Disposition Time: 23:58


Condition: STABLE


Additional Instructions: 





JOE PIÑA, thank you for letting us take care of you today. Your provider 

was Stacey Sierra MD and you were treated for 6 WKS PREG/BLEEDING. The 

emergency medical care you received today was directed at your acute symptoms. 

If you were prescribed any medication, please fill it and take as directed. It 

may take several days for your symptoms to resolve. Return to the Emergency 

Department if your symptoms worsen, do not improve, or if you have any other 

problems.





Please contact your doctor or call one of the physicians/clinics you have been 

referred to that are listed on the Patient Visit Information form that is 

included in your discharge packet. Bring any paperwork you were given at 

discharge with you along with any medications you are taking to your follow up 

visit. Our treatment cannot replace ongoing medical care by a primary care 

provider outside of the emergency department.





Thank you for allowing the [facility] team to be part of your care today.








If you had an X-Ray or CT scan: A Radiologist will review the ED reading if any 

change in treatment is needed we will contact you.***





If you had a blood, urine, or wound culture: It will take several days for the 

results, if any change in treatment is needed we will contact you.***





If you had an STI test: It will take 48 hours for the results. Please call after

1 week if you have not heard back.***


Instructions:  Threatened Miscarriage (DC)


Forms:  flux - [facility] ([place]i)


Print Language: Chadian





- Clinical Impression


Clinical Impression: 


 Threatened 








- Scribe Statement


The provider has reviewed the documentation as recorded by the Scribe





Oziel Leung





All medical record entries made by the Scribe were at my direction and 

personally dictated by me. I have reviewed the chart and agree that the record 

accurately reflects my personal performance of the history, physical exam, 

medical decision making, and the department course for this patient. I have also

personally directed, reviewed, and agree with the discharge instructions and 

disposition.

## 2019-02-19 VITALS — SYSTOLIC BLOOD PRESSURE: 99 MMHG | DIASTOLIC BLOOD PRESSURE: 60 MMHG | TEMPERATURE: 98.7 F | HEART RATE: 84 BPM

## 2019-02-19 NOTE — US
Date of service: 02/18/2019



Indication: 4w pregnant, vaginal bleeding



Comparison: Pelvis ultrasound performed 10/24/18 



Technique: Real-time transabdominal pelvic ultrasound was performed. 

In addition a transvaginal pelvic ultrasound was necessary to better 

depict pelvic anatomy. 



Findings: 



The uterus measures approximately 11.1 x 6.9 x 7.0 cm.  6.1 x 5.4 x 

6.6 cm probable left uterine fibroid; 2.4 x 2.2 x 2.2 cm and 1.5 x 

1.1 x 1.6 cm posterior uterine fibroids.  Anteverted.  Cervix length 

measures approximately 2.9 cm. 



There is a single intrauterine fetus present.  The gestational sac 

measures 1.3 cm and is compatible with a gestational age of 5 weeks 4 

days.  7 mm yolk sac.  Fetal pole is not identified.  3.3 x 2.1 x 2.6 

cm probable subchorionic hemorrhage. 



The right ovary measures 2.4 x 1.5 x 1.9 cm.  The left ovary measures 

3.1 x 2.0 x 2.6 cm and contains 2 cm probable corpus luteal cyst.  

Blood flow was demonstrated to both ovaries. 



Impression: 



Small gestational sac consistent with gestational age 5 weeks 4 days. 

 7 mm yolk sac.  No evidence of fetal pole.  Recommend clinical 

correlation including Ob GYN consultation and close interval 

follow-up. 



3.3 x 2.1 x 2.6 cm probable subchorionic hemorrhage. 



Fibroid uterus. 



Preliminary impression was provided by [facility].

## 2019-03-07 ENCOUNTER — HOSPITAL ENCOUNTER (OUTPATIENT)
Dept: HOSPITAL 31 - C.USIC | Age: 44
End: 2019-03-07
Payer: COMMERCIAL

## 2019-03-07 DIAGNOSIS — N91.2: Primary | ICD-10-CM

## 2019-03-13 ENCOUNTER — HOSPITAL ENCOUNTER (EMERGENCY)
Dept: HOSPITAL 31 - C.ER | Age: 44
Discharge: HOME | End: 2019-03-13
Payer: COMMERCIAL

## 2019-03-13 VITALS — HEART RATE: 64 BPM | RESPIRATION RATE: 18 BRPM | TEMPERATURE: 97.8 F

## 2019-03-13 VITALS — OXYGEN SATURATION: 96 % | DIASTOLIC BLOOD PRESSURE: 63 MMHG | SYSTOLIC BLOOD PRESSURE: 103 MMHG

## 2019-03-13 DIAGNOSIS — O03.9: Primary | ICD-10-CM

## 2019-03-13 LAB
ALBUMIN SERPL-MCNC: 4.6 G/DL (ref 3.5–5)
ALBUMIN/GLOB SERPL: 2 {RATIO} (ref 1–2.1)
ALT SERPL-CCNC: 18 U/L (ref 9–52)
AST SERPL-CCNC: 22 U/L (ref 14–36)
BACTERIA #/AREA URNS HPF: (no result) /[HPF]
BASOPHILS # BLD AUTO: 0 K/UL (ref 0–0.2)
BASOPHILS NFR BLD: 0.3 % (ref 0–2)
BILIRUB UR-MCNC: NEGATIVE MG/DL
BUN SERPL-MCNC: 12 MG/DL (ref 7–17)
CALCIUM SERPL-MCNC: 8.8 MG/DL (ref 8.6–10.4)
EOSINOPHIL # BLD AUTO: 0.1 K/UL (ref 0–0.7)
EOSINOPHIL NFR BLD: 1.7 % (ref 0–4)
ERYTHROCYTE [DISTWIDTH] IN BLOOD BY AUTOMATED COUNT: 13 % (ref 11.5–14.5)
GFR NON-AFRICAN AMERICAN: > 60
GLUCOSE UR STRIP-MCNC: NORMAL MG/DL
HCG,QUALITATIVE URINE: POSITIVE
HGB BLD-MCNC: 12 G/DL (ref 11–16)
LEUKOCYTE ESTERASE UR-ACNC: (no result) LEU/UL
LYMPHOCYTES # BLD AUTO: 2.1 K/UL (ref 1–4.3)
LYMPHOCYTES NFR BLD AUTO: 27.4 % (ref 20–40)
MCH RBC QN AUTO: 30.4 PG (ref 27–31)
MCHC RBC AUTO-ENTMCNC: 33.2 G/DL (ref 33–37)
MCV RBC AUTO: 91.7 FL (ref 81–99)
MONOCYTES # BLD: 0.5 K/UL (ref 0–0.8)
MONOCYTES NFR BLD: 6.3 % (ref 0–10)
NEUTROPHILS # BLD: 5 K/UL (ref 1.8–7)
NEUTROPHILS NFR BLD AUTO: 64.3 % (ref 50–75)
NRBC BLD AUTO-RTO: 0.1 % (ref 0–2)
PH UR STRIP: 5 [PH] (ref 5–8)
PLATELET # BLD: 237 K/UL (ref 130–400)
PMV BLD AUTO: 8.4 FL (ref 7.2–11.7)
PROT UR STRIP-MCNC: (no result) MG/DL
RBC # BLD AUTO: 3.96 MIL/UL (ref 3.8–5.2)
RBC # UR STRIP: (no result) /UL
SP GR UR STRIP: 1.02 (ref 1–1.03)
SQUAMOUS EPITHIAL: 12 /HPF (ref 0–5)
UROBILINOGEN UR-MCNC: 2 MG/DL (ref 0.2–1)
WBC # BLD AUTO: 7.8 K/UL (ref 4.8–10.8)

## 2019-03-13 PROCEDURE — 86900 BLOOD TYPING SEROLOGIC ABO: CPT

## 2019-03-13 PROCEDURE — 81001 URINALYSIS AUTO W/SCOPE: CPT

## 2019-03-13 PROCEDURE — 86850 RBC ANTIBODY SCREEN: CPT

## 2019-03-13 PROCEDURE — 84703 CHORIONIC GONADOTROPIN ASSAY: CPT

## 2019-03-13 PROCEDURE — 99285 EMERGENCY DEPT VISIT HI MDM: CPT

## 2019-03-13 PROCEDURE — 76817 TRANSVAGINAL US OBSTETRIC: CPT

## 2019-03-13 PROCEDURE — 76805 OB US >/= 14 WKS SNGL FETUS: CPT

## 2019-03-13 PROCEDURE — 85025 COMPLETE CBC W/AUTO DIFF WBC: CPT

## 2019-03-13 PROCEDURE — 80053 COMPREHEN METABOLIC PANEL: CPT

## 2019-03-13 PROCEDURE — 96360 HYDRATION IV INFUSION INIT: CPT

## 2019-03-13 PROCEDURE — 84702 CHORIONIC GONADOTROPIN TEST: CPT

## 2019-03-13 NOTE — US
Date of service: 



03/13/2019



PROCEDURE:  OB Pelvic Ultrasound



HISTORY:

vag bleed



LMP: 01/05/2019



COMPARISON:

Comparison is made to the previous study dated 03/07/2019



FINDINGS:



UTERUS:

Gestational sac: Intrauterine gestational sac again noted.



Fetal Heart rate: 0 bpm.



Fetal age (Ultrasound estimated): 6 weeks 2 days +/-0 weeks 3 days



Sadaf-gestational hemorrhage: Subchorionic hemorrhage is noted 

measures 1.09 x 0.38 x 0.9 centimeter.



Date of delivery (Ultrasound estimated) : 11/04/2019



Uterus measures 13.6 x 6.4 x 8.2 cm.  Again noted are multiple soft 

tissue lesions in the uterus likely represent fibroids.  The largest 

presumed fibroid is seen at the uterine fundus measures 6.8 x 6.6 x 

8.1 centimeter. 



CERVIX:

Measures 3.3 cm. Long and closed. No cervical abnormality seen.



RIGHT OVARY:

Measures 2.8 x 1.5 x 2.2 cm. No mass lesion. Normal flow. 



LEFT OVARY:

Measures 2.6 x 1.8 x 2.3 cm. No solid mass. Normal flow. 



FREE FLUID:

None.



OTHER FINDINGS:

None. 



IMPRESSION:

Abnormal intrauterine gestational sac again noted contains low-level 

echoes.



Abnormal mildly enlarged yolk sac.



No fetal heart motion detected in this exam.  The possibility of 

fetal demise should be considered.  Correlation with beta HCG level 

is suggested.



Heterogeneous enlarged uterus contains fibroids.



Redemonstrated is subchorionic hemorrhage.